# Patient Record
(demographics unavailable — no encounter records)

---

## 2024-10-10 NOTE — HISTORY OF PRESENT ILLNESS
[FreeTextEntry1] : 55 yo  here for [employer] pt having periods most months, sometimes can have a longer interval had normal sono findings after the DYB episode she  had last year

## 2024-10-10 NOTE — PLAN
[FreeTextEntry1] : [employer] pap done last year neg mammo and breast sono ref given pt to track menses, no intervention needed if bleeding is reg and not heavy discussed menopause pt needs to lose weight n/v 1 yr or PRN

## 2024-10-10 NOTE — PHYSICAL EXAM
[Chaperone Present] : A chaperone was present in the examining room during all aspects of the physical examination [[id number]] : A chaperone was present during the pelvic exam. [Soft] : soft [Non-tender] : non-tender [Non-distended] : non-distended [No Mass] : no mass [Examination Of The Breasts] : a normal appearance [No Masses] : no breast masses were palpable [Labia Majora] : normal [Labia Minora] : normal [Normal] : normal [Uterine Adnexae] : normal